# Patient Record
Sex: FEMALE | Race: WHITE
[De-identification: names, ages, dates, MRNs, and addresses within clinical notes are randomized per-mention and may not be internally consistent; named-entity substitution may affect disease eponyms.]

---

## 2021-03-23 VITALS — SYSTOLIC BLOOD PRESSURE: 141 MMHG | DIASTOLIC BLOOD PRESSURE: 91 MMHG

## 2021-03-23 LAB
ALP INTEST CFR SERPL: 58 U/L (ref 50–136)
ALT SERPL-CCNC: 26 U/L (ref 12–78)
AST SERPL-CCNC: 16 U/L (ref 0–35)
BASOPHILS # BLD AUTO: 0 10^3/UL (ref 0–0.1)
BASOPHILS NFR BLD AUTO: 0.4 % (ref 0–0.2)
CALCIUM SERPL-MCNC: 9 MG/DL (ref 8.4–10.5)
CO2 BLDA-SCNC: 29.1 MMOL/L (ref 20–32)
EOSINOPHIL # BLD AUTO: 0.4 10^3/UL (ref 0–0.2)
EOSINOPHIL NFR BLD AUTO: 4.2 % (ref 0–5)
ERYTHROCYTE [DISTWIDTH] IN BLOOD BY AUTOMATED COUNT: 13 % (ref 11.5–14.5)
GLUCOSE PRE 100 G GLC PO SERPL-MCNC: 115 MG/DL (ref 70–110)
LYMPHOCYTES # BLD AUTO: 1.87 10^3/UL1 (ref 1–4.8)
LYMPHOCYTES NFR BLD AUTO: 20.6 % (ref 24–44)
MCH RBC QN AUTO: 31 PG (ref 26–34)
MONOCYTES # BLD AUTO: 0.7 10^3/UL (ref 0.3–0.8)
MONOCYTES NFR BLD AUTO: 7.3 % (ref 5–12)
NEUTROPHILS # BLD AUTO: 6.1 10^3/UL (ref 1.8–7.7)
NEUTROPHILS NFR BLD AUTO: 67.4 % (ref 41–85)
PLATELET # BLD AUTO: 265 10^3/UL (ref 150–400)

## 2021-03-25 ENCOUNTER — HOSPITAL ENCOUNTER (OUTPATIENT)
Dept: HOSPITAL 65 - SDC | Age: 36
Discharge: HOME | End: 2021-03-25
Attending: ORTHOPAEDIC SURGERY
Payer: COMMERCIAL

## 2021-03-25 VITALS — SYSTOLIC BLOOD PRESSURE: 119 MMHG | DIASTOLIC BLOOD PRESSURE: 77 MMHG

## 2021-03-25 VITALS — SYSTOLIC BLOOD PRESSURE: 111 MMHG | DIASTOLIC BLOOD PRESSURE: 76 MMHG

## 2021-03-25 VITALS — DIASTOLIC BLOOD PRESSURE: 81 MMHG | SYSTOLIC BLOOD PRESSURE: 116 MMHG

## 2021-03-25 VITALS — SYSTOLIC BLOOD PRESSURE: 110 MMHG | DIASTOLIC BLOOD PRESSURE: 75 MMHG

## 2021-03-25 VITALS — DIASTOLIC BLOOD PRESSURE: 76 MMHG | SYSTOLIC BLOOD PRESSURE: 108 MMHG

## 2021-03-25 VITALS — SYSTOLIC BLOOD PRESSURE: 113 MMHG | DIASTOLIC BLOOD PRESSURE: 68 MMHG

## 2021-03-25 VITALS — SYSTOLIC BLOOD PRESSURE: 111 MMHG | DIASTOLIC BLOOD PRESSURE: 80 MMHG

## 2021-03-25 VITALS — DIASTOLIC BLOOD PRESSURE: 78 MMHG | SYSTOLIC BLOOD PRESSURE: 115 MMHG

## 2021-03-25 VITALS — SYSTOLIC BLOOD PRESSURE: 122 MMHG | DIASTOLIC BLOOD PRESSURE: 92 MMHG

## 2021-03-25 VITALS — SYSTOLIC BLOOD PRESSURE: 120 MMHG | DIASTOLIC BLOOD PRESSURE: 78 MMHG

## 2021-03-25 VITALS — SYSTOLIC BLOOD PRESSURE: 102 MMHG | DIASTOLIC BLOOD PRESSURE: 66 MMHG

## 2021-03-25 VITALS — DIASTOLIC BLOOD PRESSURE: 78 MMHG | SYSTOLIC BLOOD PRESSURE: 122 MMHG

## 2021-03-25 VITALS — DIASTOLIC BLOOD PRESSURE: 90 MMHG | SYSTOLIC BLOOD PRESSURE: 122 MMHG

## 2021-03-25 VITALS — DIASTOLIC BLOOD PRESSURE: 80 MMHG | SYSTOLIC BLOOD PRESSURE: 108 MMHG

## 2021-03-25 VITALS — DIASTOLIC BLOOD PRESSURE: 76 MMHG | SYSTOLIC BLOOD PRESSURE: 115 MMHG

## 2021-03-25 VITALS — SYSTOLIC BLOOD PRESSURE: 129 MMHG | DIASTOLIC BLOOD PRESSURE: 73 MMHG

## 2021-03-25 VITALS — DIASTOLIC BLOOD PRESSURE: 72 MMHG | SYSTOLIC BLOOD PRESSURE: 109 MMHG

## 2021-03-25 VITALS — DIASTOLIC BLOOD PRESSURE: 78 MMHG | SYSTOLIC BLOOD PRESSURE: 107 MMHG

## 2021-03-25 VITALS — SYSTOLIC BLOOD PRESSURE: 112 MMHG | DIASTOLIC BLOOD PRESSURE: 71 MMHG

## 2021-03-25 VITALS — HEIGHT: 62 IN | WEIGHT: 198 LBS | BODY MASS INDEX: 36.44 KG/M2
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
## 2021-03-25 DIAGNOSIS — E66.9: ICD-10-CM

## 2021-03-25 DIAGNOSIS — Z98.51: ICD-10-CM

## 2021-03-25 DIAGNOSIS — Z91.040: ICD-10-CM
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
## 2021-03-25 DIAGNOSIS — Z90.49: ICD-10-CM

## 2021-03-25 DIAGNOSIS — S83.242A: Primary | ICD-10-CM

## 2021-03-25 DIAGNOSIS — J45.909: ICD-10-CM

## 2021-03-25 DIAGNOSIS — Z72.89: ICD-10-CM

## 2021-03-25 DIAGNOSIS — Z98.890: ICD-10-CM

## 2021-03-25 DIAGNOSIS — M67.52: ICD-10-CM

## 2021-03-25 PROCEDURE — 80053 COMPREHEN METABOLIC PANEL: CPT

## 2021-03-25 PROCEDURE — 84703 CHORIONIC GONADOTROPIN ASSAY: CPT

## 2021-03-25 PROCEDURE — 85025 COMPLETE CBC W/AUTO DIFF WBC: CPT

## 2021-03-25 PROCEDURE — 29875 ARTHRS KNEE SURG SYNVCT LMTD: CPT

## 2021-03-25 PROCEDURE — A4217 STERILE WATER/SALINE, 500 ML: HCPCS

## 2021-03-25 PROCEDURE — 36415 COLL VENOUS BLD VENIPUNCTURE: CPT

## 2021-03-25 NOTE — OPH
DATE OF SURGERY:  03/25/2021



PREOPERATIVE DIAGNOSIS:  Medial meniscal tear of the left knee.



POSTOPERATIVE DIAGNOSIS:  Plica syndrome of the left knee.



OPERATIVE PROCEDURE:  Left knee arthroscopy with partial synovectomy.



SURGEON:  Maxime Golden MD



ANESTHESIA:  LMA.



TOURNIQUET TIME:  None.



BLOOD LOSS:  10 mL.



DESCRIPTION OF INDICATIONS:  The patient is a 35-year-old female injured her

left knee at work about 2 months ago.  She complains of painful popping about

the knee, mainly on the medial side.  She has full range of motion of the knee,

good medial and lateral stability.  She had quite a bit of tenderness about the

medial joint line to palpation.  Her Nery test was positive.  Drew's

test was negative.  Plain x-rays were negative for any arthritic changes or

loose bodies.  She has had 2 MRI scans.  The first MRI scan was read out as

normal.  The second MRI scan was read out as having a tear of the posterior horn

of the medial meniscus.  The patient was having painful mechanical symptoms on a

daily basis and therefore because she had not improved with formal physical

therapy, anti-inflammatories and decreased activities.  She was taken to the

operating room today for arthroscopy of the left knee.



DESCRIPTION OF PROCEDURE:  The patient was placed on the operating table in the

supine position.  LMA anesthetic was induced without difficulty.  The patient

had the well-padded tourniquet placed around the left thigh.  The left lower

extremity was then sterilely prepped and draped.  The patient had the

arthroscopy portals made superolateral, anterolateral and anteromedial.  The

patient had the outflow cannula was superolateral, the arthroscope was

anterolateral and the probe was anteromedial.  Suprapatellar pouch was viewed. 

There was no hypertrophic synovium.  The articular cartilage about the patella

as well as the femoral sulcus was totally within normal limits.  She was noted

to have a large plica about the medial portion of the patellofemoral joint. 

Later in the case, a shaver was introduced through the anteromedial portal and

the plica was resected back to capsule.  The patient then had the medial and

lateral gutters viewed.  There were no loose bodies, no arthritic changes.  The

peripheral edges of the lateral meniscus as well as the popliteus tendon were

noted to be normal.  Peripheral edges of the medial meniscus likewise were

normal.  The arthroscope was introduced into the medial compartment.  The

articular cartilage proximally and distally was totally within normal limits to

visualization as well as probing.  The medial meniscus was viewed throughout its

entirety and found to be normal to visualization and probing.  The arthroscope

was passed through the intercondylar notch and the posterior horn of the medial

meniscus was viewed and it was again normal to visualization and probing.  The

intercondylar notch was viewed.  The anterior and posterior cruciate ligaments

were normal.  The knee was placed in a figure-of-four position and the lateral

articular cartilage was normal proximally and distally.  The lateral meniscus

was normal throughout its entirety to visualization and probing.



The arthroscopic equipment was then removed from the knee.  The portal tracts

were closed with 3-0 Ethilon in a horizontal mattress method.  The patient had a

compressive dressing applied consisting of 4 x 4's, ABD pad, cast padding and

Ace wrap.  She was extubated in the operating room and sent to recovery in

stable condition.





______________________________

Maxime Golden MD



DR:  RAQUEL/franklin  JOB# [id number]  [id number]

DD:  03/25/2021 09:00  DT:  03/25/2021 09:13